# Patient Record
Sex: MALE | Race: WHITE | NOT HISPANIC OR LATINO | Employment: FULL TIME | URBAN - METROPOLITAN AREA
[De-identification: names, ages, dates, MRNs, and addresses within clinical notes are randomized per-mention and may not be internally consistent; named-entity substitution may affect disease eponyms.]

---

## 2017-11-28 ENCOUNTER — TRANSCRIBE ORDERS (OUTPATIENT)
Dept: ADMINISTRATIVE | Age: 57
End: 2017-11-28

## 2017-11-28 ENCOUNTER — APPOINTMENT (OUTPATIENT)
Dept: RADIOLOGY | Age: 57
End: 2017-11-28
Payer: COMMERCIAL

## 2017-11-28 ENCOUNTER — APPOINTMENT (OUTPATIENT)
Dept: LAB | Age: 57
End: 2017-11-28
Payer: COMMERCIAL

## 2017-11-28 DIAGNOSIS — C64.9 MALIGNANT NEOPLASM OF KIDNEY EXCLUDING RENAL PELVIS, UNSPECIFIED LATERALITY (HCC): ICD-10-CM

## 2017-11-28 DIAGNOSIS — N40.1 ENLARGED PROSTATE WITH URINARY OBSTRUCTION: ICD-10-CM

## 2017-11-28 DIAGNOSIS — N13.8 ENLARGED PROSTATE WITH URINARY OBSTRUCTION: ICD-10-CM

## 2017-11-28 DIAGNOSIS — C64.9 MALIGNANT NEOPLASM OF KIDNEY EXCLUDING RENAL PELVIS, UNSPECIFIED LATERALITY (HCC): Primary | ICD-10-CM

## 2017-11-28 LAB
BUN SERPL-MCNC: 19 MG/DL (ref 5–25)
CREAT SERPL-MCNC: 1.91 MG/DL (ref 0.6–1.3)
ERYTHROCYTE [DISTWIDTH] IN BLOOD BY AUTOMATED COUNT: 13.5 % (ref 11.6–15.1)
GFR SERPL CREATININE-BSD FRML MDRD: 38 ML/MIN/1.73SQ M
HCT VFR BLD AUTO: 44.4 % (ref 36.5–49.3)
HGB BLD-MCNC: 15 G/DL (ref 12–17)
MCH RBC QN AUTO: 30.4 PG (ref 26.8–34.3)
MCHC RBC AUTO-ENTMCNC: 33.8 G/DL (ref 31.4–37.4)
MCV RBC AUTO: 90 FL (ref 82–98)
PLATELET # BLD AUTO: 189 THOUSANDS/UL (ref 149–390)
PMV BLD AUTO: 12.1 FL (ref 8.9–12.7)
PSA SERPL-MCNC: 1.2 NG/ML (ref 0–4)
RBC # BLD AUTO: 4.94 MILLION/UL (ref 3.88–5.62)
WBC # BLD AUTO: 6.98 THOUSAND/UL (ref 4.31–10.16)

## 2017-11-28 PROCEDURE — 36415 COLL VENOUS BLD VENIPUNCTURE: CPT

## 2017-11-28 PROCEDURE — 84520 ASSAY OF UREA NITROGEN: CPT

## 2017-11-28 PROCEDURE — G0103 PSA SCREENING: HCPCS

## 2017-11-28 PROCEDURE — 82565 ASSAY OF CREATININE: CPT

## 2017-11-28 PROCEDURE — 85027 COMPLETE CBC AUTOMATED: CPT

## 2017-11-28 PROCEDURE — 71020 HB CHEST X-RAY 2VW FRONTAL&LATL: CPT

## 2017-12-05 ENCOUNTER — LAB REQUISITION (OUTPATIENT)
Dept: LAB | Facility: HOSPITAL | Age: 57
End: 2017-12-05
Payer: COMMERCIAL

## 2017-12-05 DIAGNOSIS — N39.0 URINARY TRACT INFECTION: ICD-10-CM

## 2017-12-05 DIAGNOSIS — N40.1 ENLARGED PROSTATE WITH LOWER URINARY TRACT SYMPTOMS (LUTS): ICD-10-CM

## 2017-12-05 PROCEDURE — 87147 CULTURE TYPE IMMUNOLOGIC: CPT | Performed by: UROLOGY

## 2017-12-05 PROCEDURE — 87186 SC STD MICRODIL/AGAR DIL: CPT | Performed by: UROLOGY

## 2017-12-05 PROCEDURE — 87086 URINE CULTURE/COLONY COUNT: CPT | Performed by: UROLOGY

## 2017-12-07 LAB — BACTERIA UR CULT: ABNORMAL

## 2017-12-26 ENCOUNTER — LAB REQUISITION (OUTPATIENT)
Dept: LAB | Facility: HOSPITAL | Age: 57
End: 2017-12-26
Payer: COMMERCIAL

## 2017-12-26 DIAGNOSIS — N39.0 URINARY TRACT INFECTION: ICD-10-CM

## 2017-12-26 PROCEDURE — 87086 URINE CULTURE/COLONY COUNT: CPT | Performed by: UROLOGY

## 2017-12-27 LAB — BACTERIA UR CULT: NORMAL

## 2025-07-07 ENCOUNTER — TELEPHONE (OUTPATIENT)
Age: 65
End: 2025-07-07

## 2025-07-07 NOTE — TELEPHONE ENCOUNTER
New Patient      Insurance   Current Insurance?[employer]- pt has new member ID and will call back to update   Insurance E-verified?     History   Reason for appointment/active symptoms?  transferring from Dr Christensen- hx of renal cancer.    Pt sts he had right kidney removed around 2018.  Pt also experiencing urinary symptoms.      Has the patient had any previous Urologist(s)? yes- Dr Christensen      Was the patient seen in the ED? no     Labs/Imaging(Including Out Of Network)? labs     Records Requested? yes- email sent to have records sent over  Records Visible in EPIC?      Personal history of cancer? yes renal     Appointment   Office location preference:  [place]  ?   Appointment Details   Date:  8/14/25  Time:  1:40pm  Location:  [place]  Provider:  Daniella Mantilla   Does the appointment need further review?